# Patient Record
Sex: MALE | Race: AMERICAN INDIAN OR ALASKA NATIVE
[De-identification: names, ages, dates, MRNs, and addresses within clinical notes are randomized per-mention and may not be internally consistent; named-entity substitution may affect disease eponyms.]

---

## 2019-11-26 ENCOUNTER — HOSPITAL ENCOUNTER (EMERGENCY)
Dept: HOSPITAL 5 - ED | Age: 29
Discharge: LEFT BEFORE DISCHARGE | End: 2019-11-26
Payer: SELF-PAY

## 2019-11-26 VITALS — SYSTOLIC BLOOD PRESSURE: 114 MMHG | DIASTOLIC BLOOD PRESSURE: 74 MMHG

## 2019-11-26 DIAGNOSIS — M79.10: Primary | ICD-10-CM

## 2019-11-26 DIAGNOSIS — R09.81: ICD-10-CM

## 2019-11-26 PROCEDURE — 99282 EMERGENCY DEPT VISIT SF MDM: CPT

## 2019-11-26 NOTE — EMERGENCY DEPARTMENT REPORT
Chief Complaint: Headache


Stated Complaint: FLU/STOMACH PAIN


Time Seen by Provider: 11/26/19 19:52





- HPI


History of Present Illness: 





This is a 29-year-old male nontoxic well in appearance with no signs of distress

presents to the ED with complaint of chills, myalgia, and congestion for several

days.  Patient denies any drooling or hoarseness.  Denies any fever, headache, 

nausea, vomiting, chest pain or SOB.  Denies any other complaints. 








- ROS


Review of Systems: 





Review of Systems: 





ROS: 


Stated complaint: chills, myalgia, and congestion


Other details as noted in HPI





Comment: All other systems reviewed and negative


Constitutional: Congestion and chills.  denies: fever


Respiratory:  denies: cough, shortness of breath.  


Cardiovascular: denies: chest pain


Gastrointestinal: denies: nausea, vomiting


Musculoskeletal: myalgia








- Exam


Vital Signs: 


                                   Vital Signs











  11/26/19





  19:35


 


Temperature 98.6 F


 


Pulse Rate 93 H


 


Respiratory 18





Rate 


 


Blood Pressure 114/74


 


O2 Sat by Pulse 99





Oximetry 











Physical Exam: 





- General 


Limitations: No Limitations


General appearance: alert, in no apparent distress


Eye exam: Present: normal appearance, EOMI


ENT exam: Present: turbinates congested with clear discharge, uvula midline, no 

peritonsular swelling or exudate, mucous membranes moist, no tenderness on 

percussion of sinuses


Neck exam: Present: normal inspection


Respiratory exam: Present: normal lung sounds bilaterally.  Absent: respiratory 

distress, wheezes, rales, rhonchi, stridor


Cardiovascular Exam: Present: regular rate, normal rhythm


GI/Abdominal exam: Present: soft.  Absent: distended, tenderness


Extremities exam: Present: normal inspection.  Absent: pedal edema, calf 

tenderness


Neurological exam: Present: alert, oriented X3


Psychiatric exam: Present: normal affect, normal mood


Skin exam: Present: warm, dry, intact, normal color





MSE screening note: 


Focused history and physical exam performed.


Due to findings the following was ordered:











ED Medical Decision Making





- Medical Decision Making





This is a 29-year-old male that presents with URI symptoms.  Patient is stable 

was examined by me.  She is in no acute distress.  Vitals are normal.  

Congestion with clear discharge on exam.  This appear to be a upper respiratory 

infection.  Patient given lis of OTC medication to try.  Follow-up with a 

primary care doctor in 3-5 days or if symptoms worsen and continue return to 

emergency room as soon as possible.  At time of discharge, the patient does not 

seem toxic or ill in appearance.  No acute signs of distress noted.  Patient 

agrees to discharge treatment plan of care.  No further questions noted by the 

patient.








ED Disposition for MSE


Disposition: Z-07 MED SCREENING EXAM-LEFT


Is pt being admited?: No


Condition: Stable


Instructions:  Upper Respiratory Infection (ED)


Additional Instructions: 


Take OTC Robitussin, flonase, imodium, and zyrtec for symptom relief.


Follow-up with a primary care doctor in 3-5 days or if symptoms worsen and 

continue return to the emergency department as soon as possible.





Referrals: 


[facility] [Outside] - 3-5 Days


[facility] [Outside] - 3-5 Days


[facility] [Outside] - 3-5 Days


Time of Disposition: 19:56